# Patient Record
Sex: MALE | Race: WHITE | NOT HISPANIC OR LATINO
[De-identification: names, ages, dates, MRNs, and addresses within clinical notes are randomized per-mention and may not be internally consistent; named-entity substitution may affect disease eponyms.]

---

## 2022-02-28 PROBLEM — Z00.00 ENCOUNTER FOR PREVENTIVE HEALTH EXAMINATION: Status: ACTIVE | Noted: 2022-02-28

## 2022-03-08 ENCOUNTER — APPOINTMENT (OUTPATIENT)
Dept: SURGERY | Facility: CLINIC | Age: 72
End: 2022-03-08
Payer: MEDICARE

## 2022-03-08 VITALS
HEIGHT: 67 IN | TEMPERATURE: 98.2 F | BODY MASS INDEX: 24.71 KG/M2 | DIASTOLIC BLOOD PRESSURE: 70 MMHG | SYSTOLIC BLOOD PRESSURE: 102 MMHG | WEIGHT: 157.4 LBS

## 2022-03-08 DIAGNOSIS — K40.90 UNILATERAL INGUINAL HERNIA, W/OUT OBSTRUCTION OR GANGRENE, NOT SPECIFIED AS RECURRENT: ICD-10-CM

## 2022-03-08 DIAGNOSIS — J30.2 OTHER SEASONAL ALLERGIC RHINITIS: ICD-10-CM

## 2022-03-08 DIAGNOSIS — Z83.3 FAMILY HISTORY OF DIABETES MELLITUS: ICD-10-CM

## 2022-03-08 DIAGNOSIS — Z82.49 FAMILY HISTORY OF ISCHEMIC HEART DISEASE AND OTHER DISEASES OF THE CIRCULATORY SYSTEM: ICD-10-CM

## 2022-03-08 DIAGNOSIS — G47.00 INSOMNIA, UNSPECIFIED: ICD-10-CM

## 2022-03-08 DIAGNOSIS — Z80.1 FAMILY HISTORY OF MALIGNANT NEOPLASM OF TRACHEA, BRONCHUS AND LUNG: ICD-10-CM

## 2022-03-08 DIAGNOSIS — Z86.79 PERSONAL HISTORY OF OTHER DISEASES OF THE CIRCULATORY SYSTEM: ICD-10-CM

## 2022-03-08 DIAGNOSIS — K40.91 UNILATERAL INGUINAL HERNIA, W/OUT OBSTRUCTION OR GANGRENE, RECURRENT: ICD-10-CM

## 2022-03-08 DIAGNOSIS — J45.909 UNSPECIFIED ASTHMA, UNCOMPLICATED: ICD-10-CM

## 2022-03-08 DIAGNOSIS — H93.19 TINNITUS, UNSPECIFIED EAR: ICD-10-CM

## 2022-03-08 DIAGNOSIS — Z86.19 PERSONAL HISTORY OF OTHER INFECTIOUS AND PARASITIC DISEASES: ICD-10-CM

## 2022-03-08 DIAGNOSIS — K42.9 UMBILICAL HERNIA W/OUT OBSTRUCTION OR GANGRENE: ICD-10-CM

## 2022-03-08 DIAGNOSIS — I71.2 THORACIC AORTIC ANEURYSM, W/OUT RUPTURE: ICD-10-CM

## 2022-03-08 DIAGNOSIS — K21.9 GASTRO-ESOPHAGEAL REFLUX DISEASE W/OUT ESOPHAGITIS: ICD-10-CM

## 2022-03-08 DIAGNOSIS — Z87.891 PERSONAL HISTORY OF NICOTINE DEPENDENCE: ICD-10-CM

## 2022-03-08 PROCEDURE — 99203 OFFICE O/P NEW LOW 30 MIN: CPT

## 2022-03-08 RX ORDER — CETIRIZINE HYDROCHLORIDE 10 MG/1
10 CAPSULE, LIQUID FILLED ORAL
Refills: 0 | Status: ACTIVE | COMMUNITY

## 2022-03-08 RX ORDER — CHROMIUM 200 MCG
TABLET ORAL
Refills: 0 | Status: ACTIVE | COMMUNITY

## 2022-03-08 RX ORDER — ACETAMINOPHEN/DIPHENHYDRAMINE 500MG-25MG
1000 TABLET ORAL
Refills: 0 | Status: ACTIVE | COMMUNITY

## 2022-03-08 RX ORDER — OMEPRAZOLE 20 MG/1
20 CAPSULE, DELAYED RELEASE ORAL
Refills: 0 | Status: ACTIVE | COMMUNITY

## 2022-03-08 RX ORDER — ESZOPICLONE 3 MG/1
3 TABLET, COATED ORAL
Refills: 0 | Status: ACTIVE | COMMUNITY

## 2022-03-08 NOTE — ASSESSMENT
[FreeTextEntry1] : This is a 71-year-old gentleman who underwent a robotic assisted laparoscopic left inguinal hernia repair in August 2021.  That surgery was done with 3D max mesh.  Following that surgery the patient had noted a lump immediately in the left groin region.  He had followed up with his primary surgeon and was told that this was related to postoperative changes.  Ultimately the lump is gotten larger and he seeks opinion of a second surgeon that I ordered further's imaging studies with a CAT scan.  The CT demonstrated findings consistent with a large left inguinal recurrent hernia, right fat-containing inguinal hernia and a small fat-containing umbilical hernia.  Opinions from that second surgeon was to go back in repair of the left inguinal hernia robotically/laparoscopically as well as address the right inguinal hernia at the same setting.\par \par Patient is here today to discuss options and seek a second opinion.  He has no complaints of pain at the level of the umbilicus.  He has no complaints of discomfort in the right groin.  Patient states he has discomfort in the left groin with prolonged physical activity or standing. he complains of having a protrusion in the left groin that is gotten larger as well.  He has no bouts of nausea or vomiting or change in bowel habits.  No history of fever chills or sweats.\par \par Physical examination: Patient examined erect and supine position.  The abdomen is soft nontender nondistended.  He has a very small chronically incarcerated umbilical hernia.  This would be barely noticeable if not for the CAT scan reading.  He has no obvious hernia clinically in the right groin and the right scrotum and testicles within normal limits.  He has an obvious large recurrent left inguinal hernia.  The hernia is mostly reducible in the supine position however a small portion has not completely reduced.  Left scrotum and testicles within normal limits.\par \par He has well-healed scars.\par \par Impression/plan recurrent left inguinal hernia: This is a 71-year-old gentleman who August 2021 underwent a robotic laparoscopic  assisted left inguinal hernia repair with [employer] 3D max.  Patient had immediate recurrence after surgery.  Clearly the hernia is gotten larger according the patient's history and now has become more symptomatic with discomfort secondary to prolonged standing or strenuous activity.\par \par I would recommend to repair this hernia in an open fashion with mesh.  At this point the patient his wife will discuss his surgical options and decide whether or not to proceed to have surgery here with me or [place].  No surgery has been scheduled at this point.  I will see this gentleman on a as needed basis depending on his choice of surgeons.\par \par Thank you for allowing me to participate in the care of your patient.  Should you have any questions please feel free to give me a call directly.\par \par Sincerely yours

## 2022-03-11 ENCOUNTER — TRANSCRIPTION ENCOUNTER (OUTPATIENT)
Age: 72
End: 2022-03-11

## 2024-07-24 ENCOUNTER — NON-APPOINTMENT (OUTPATIENT)
Age: 74
End: 2024-07-24